# Patient Record
Sex: MALE | Race: BLACK OR AFRICAN AMERICAN
[De-identification: names, ages, dates, MRNs, and addresses within clinical notes are randomized per-mention and may not be internally consistent; named-entity substitution may affect disease eponyms.]

---
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
## 2019-07-24 NOTE — ER DOCUMENT REPORT
HPI





- HPI


Time Seen by Provider: 07/24/19 11:39


Pain Level: 5


Notes: 





33-year-old male presents the ED with complaints of right lower dental pain for 

the last 2 months, patient does have a dental appointment tomorrow morning, 

patient states the pain has become progressively worse, has not tried any 

over-the-counter medications, has not tried any heat or icing.  Denies any 

fevers or chills.  Has not been put on an antibiotic.  Patient does have dental 

cavities.  Pain is 7 out of 10, throbbing achy.  Denies any drug allergies.  

Denies fevers, chills,  chest pain,palpitations,  shortness of breath, dyspnea, 

nausea, vomiting, diarrhea, abdominal pain, hematuria,blurred vision, double 

vision, loss of vision, speech changes, LH, dizziness, syncope, headaches, 

wheezing, ST, URI, neck pain, weakness.





- CONSTITUTIONAL


Constitutional: DENIES: Fever, Chills





- EENT


EENT: DENIES: Sore Throat, Ear Pain, Eye problems





- NEURO


Neurology: DENIES: Headache, Weakness, Vision blurred, Dizzinesss / Vertigo





- CARDIOVASCULAR


Cardiovascular: DENIES: Chest pain





- RESPIRATORY


Respiratory: DENIES: Trouble Breathing, Coughing





- GASTROINTESTINAL


Gastrointestinal: DENIES: Abdominal Pain, Black / Bloody Stools





- URINARY


Urinary: DENIES: Dysuria, Urgency, Frequency





- MUSCULOSKELETAL


Musculoskeletal: DENIES: Extremity pain





Past Medical History





- General


Information source: Patient





- Social History


Smoking Status: Current Every Day Smoker


Chew tobacco use (# tins/day): No


Frequency of alcohol use: None


Drug Abuse: None


Family History: Reviewed & Not Pertinent, DM, Hypertension


Patient has suicidal ideation: No


Patient has homicidal ideation: No


Renal/ Medical History: Denies: Hx Peritoneal Dialysis


Musculoskeletal Medical History: Reports Hx Musculoskeletal Deformity, Reports 

Hx Musculoskeletal Trauma


Psychiatric Medical History: Reports: Hx Schizophrenia





- Immunizations


Hx Diphtheria, Pertussis, Tetanus Vaccination: Yes





Vertical Provider Document





- CONSTITUTIONAL


Agree With Documented VS: Yes


Exam Limitations: No Limitations


Notes: 





PHYSICAL EXAMINATION:





GENERAL: Well-appearing, well-nourished and in no acute distress.





HEAD: Atraumatic, normocephalic.





EYES: Pupils equal round and reactive to light, extraocular movements intact, 

sclera anicteric, conjunctiva are normal.





ENT: Nares patent, oropharynx clear without exudates.  Moist mucous membranes.  

#19,18 gingiva with swelling, erythema and  induration. No drainage or open 

wounds. No fluctuance. No facial swelling. Poor oral dentition, right lower jaw 

with extensive dental caries, no definite swelling or effusion.





NECK: Normal range of motion, supple without lymphadenopathy





LUNGS: Breath sounds clear to auscultation bilaterally and equal.  No wheezes 

rales or rhonchi.





HEART: Regular rate and rhythm without murmurs





ABDOMEN: Soft, nontender, nondistended abdomen.  No guarding, no rebound.  No 

masses appreciated.





Musculoskeletal: Normal range of motion, no pitting or edema.  No cyanosis.





NEUROLOGICAL: Cranial nerves grossly intact.  Normal speech, normal gait.  

Normal sensory, motor exams 





PSYCH: Normal mood, normal affect.





SKIN: Warm, Dry, normal turgor, no rashes or lesions noted.





- INFECTION CONTROL


TRAVEL OUTSIDE OF THE [place] IN LAST 30 DAYS: No





Course





- Re-evaluation


Re-evalutation: 





07/24/19 12:22


Vital signs stable, nursing notes reviewed, afebrile.  Discussed with patient 

that I need to start on an antibiotic for his dental caries, Toradol 60 mg given

IM for anti-inflammatory, alternate between Tylenol and ibuprofen. Presentation 

is most consistent with likely an infected tooth.  Airway is patent.  Vitals 

within normal limits.  Patient is able swallow without any difficulty.  There is

no significant facial swelling.  No evidence of He angina, apical abscess, o

r airway obstruction.  Patient will be started on antibiotics.  I've instructed 

to follow-up with dentistry as earliest ability for definitive management.  At 

this time will discharge with return precautions and follow-up recommendations. 

Verbal discharge instructions given a the bedside and opportunity for questions 

given. Medication warnings reviewed. Patient is in agreement with this plan and 

has verbalized understanding of return precautions and the need for primary care

follow-up in the next 24-72 hours.





- Vital Signs


Vital signs: 


                                        











Temp Pulse Resp BP Pulse Ox


 


 97.9 F   72   18   111/61   97 


 


 07/24/19 11:17  07/24/19 11:17  07/24/19 11:17  07/24/19 11:17  07/24/19 11:17














Discharge





- Discharge


Clinical Impression: 


 Dental caries





Condition: Stable


Disposition: HOME, SELF-CARE


Instructions:  Dentist, Dental Infection or Abscess ([facility])


Additional Instructions: 


Take antibiotics as directed, take with food to prevent any GI upset.  You are 

given Toradol today anti-inflammatory, apply heat 20 minutes on 20 minutes off 

several times a day.  Go to your dental appointment tomorrow as already 

scheduled.  Return if you experience any fevers, worsening pain, headache, neck 

pain, trismus, shortness of breath, confusion, chest pain etc.  





Return immediately for any new or worsening symptoms.





Follow up with primary care provider, call tomorrow to make followup appoint

ment.


Prescriptions: 


Clindamycin HCl 300 mg PO Q6H #28 capsule


Forms:  Return to Work


Referrals: 


SEVERO CASTRO MD [[facility]] - Follow up as needed

## 2019-12-15 ENCOUNTER — HOSPITAL ENCOUNTER (EMERGENCY)
Dept: HOSPITAL 62 - ER | Age: 34
Discharge: HOME | End: 2019-12-15
Payer: MEDICAID

## 2019-12-15 VITALS — SYSTOLIC BLOOD PRESSURE: 125 MMHG | DIASTOLIC BLOOD PRESSURE: 68 MMHG

## 2019-12-15 DIAGNOSIS — R19.7: ICD-10-CM

## 2019-12-15 DIAGNOSIS — R10.9: ICD-10-CM

## 2019-12-15 DIAGNOSIS — Z79.899: ICD-10-CM

## 2019-12-15 DIAGNOSIS — R11.2: Primary | ICD-10-CM

## 2019-12-15 LAB
ADD MANUAL DIFF: NO
ALBUMIN SERPL-MCNC: 4.7 G/DL (ref 3.5–5)
ALP SERPL-CCNC: 81 U/L (ref 38–126)
ANION GAP SERPL CALC-SCNC: 11 MMOL/L (ref 5–19)
APPEARANCE UR: CLEAR
APTT PPP: YELLOW S
AST SERPL-CCNC: 23 U/L (ref 17–59)
BASOPHILS # BLD AUTO: 0 10^3/UL (ref 0–0.2)
BASOPHILS NFR BLD AUTO: 0.4 % (ref 0–2)
BILIRUB DIRECT SERPL-MCNC: 0.1 MG/DL (ref 0–0.4)
BILIRUB SERPL-MCNC: 0.5 MG/DL (ref 0.2–1.3)
BILIRUB UR QL STRIP: NEGATIVE
BUN SERPL-MCNC: 11 MG/DL (ref 7–20)
CALCIUM: 10.3 MG/DL (ref 8.4–10.2)
CHLORIDE SERPL-SCNC: 99 MMOL/L (ref 98–107)
CO2 SERPL-SCNC: 28 MMOL/L (ref 22–30)
EOSINOPHIL # BLD AUTO: 0 10^3/UL (ref 0–0.6)
EOSINOPHIL NFR BLD AUTO: 0 % (ref 0–6)
ERYTHROCYTE [DISTWIDTH] IN BLOOD BY AUTOMATED COUNT: 13.2 % (ref 11.5–14)
GLUCOSE SERPL-MCNC: 112 MG/DL (ref 75–110)
GLUCOSE UR STRIP-MCNC: NEGATIVE MG/DL
HCT VFR BLD CALC: 45.3 % (ref 37.9–51)
HGB BLD-MCNC: 15.6 G/DL (ref 13.5–17)
KETONES UR STRIP-MCNC: (no result) MG/DL
LYMPHOCYTES # BLD AUTO: 1 10^3/UL (ref 0.5–4.7)
LYMPHOCYTES NFR BLD AUTO: 14 % (ref 13–45)
MCH RBC QN AUTO: 32.6 PG (ref 27–33.4)
MCHC RBC AUTO-ENTMCNC: 34.6 G/DL (ref 32–36)
MCV RBC AUTO: 94 FL (ref 80–97)
MONOCYTES # BLD AUTO: 0.2 10^3/UL (ref 0.1–1.4)
MONOCYTES NFR BLD AUTO: 3.2 % (ref 3–13)
NEUTROPHILS # BLD AUTO: 6 10^3/UL (ref 1.7–8.2)
NEUTS SEG NFR BLD AUTO: 82.4 % (ref 42–78)
NITRITE UR QL STRIP: NEGATIVE
PH UR STRIP: 7 [PH] (ref 5–9)
PLATELET # BLD: 189 10^3/UL (ref 150–450)
POTASSIUM SERPL-SCNC: 4 MMOL/L (ref 3.6–5)
PROT SERPL-MCNC: 8.2 G/DL (ref 6.3–8.2)
PROT UR STRIP-MCNC: 30 MG/DL
RBC # BLD AUTO: 4.8 10^6/UL (ref 4.35–5.55)
SP GR UR STRIP: 1.03
TOTAL CELLS COUNTED % (AUTO): 100 %
UROBILINOGEN UR-MCNC: NEGATIVE MG/DL (ref ?–2)
WBC # BLD AUTO: 7.3 10^3/UL (ref 4–10.5)

## 2019-12-15 PROCEDURE — 80053 COMPREHEN METABOLIC PANEL: CPT

## 2019-12-15 PROCEDURE — 36415 COLL VENOUS BLD VENIPUNCTURE: CPT

## 2019-12-15 PROCEDURE — 99284 EMERGENCY DEPT VISIT MOD MDM: CPT

## 2019-12-15 PROCEDURE — 85025 COMPLETE CBC W/AUTO DIFF WBC: CPT

## 2019-12-15 PROCEDURE — 81001 URINALYSIS AUTO W/SCOPE: CPT

## 2019-12-15 PROCEDURE — 96375 TX/PRO/DX INJ NEW DRUG ADDON: CPT

## 2019-12-15 PROCEDURE — 96374 THER/PROPH/DIAG INJ IV PUSH: CPT

## 2019-12-15 PROCEDURE — 96361 HYDRATE IV INFUSION ADD-ON: CPT

## 2019-12-15 NOTE — ER DOCUMENT REPORT
HPI





- HPI


Time Seen by Provider: 12/15/19 17:48


Pain Level: 2


Notes: 





34-year-old male patient presenting to the emergency department chief complaint 

of nausea, vomiting abdominal pain.  Patient reports symptoms started 2 days 

ago.  Patient reports he was around people with similar symptoms.  He denies any

diarrhea or fever.  He does report that he is supposed to be taking Lexapro and 

Haldol and stopped taking them 2 days ago.








- REPRODUCTIVE


Reproductive: DENIES: Pregnant:





Past Medical History





- General


Information source: Patient, Relative, Emergency Med Personnel





- Social History


Smoking Status: Never Smoker


Chew tobacco use (# tins/day): No


Frequency of alcohol use: None


Drug Abuse: None


Family History: Reviewed & Not Pertinent, DM, Hypertension


Patient has suicidal ideation: No


Patient has homicidal ideation: No


Renal/ Medical History: Denies: Hx Peritoneal Dialysis


Musculoskeletal Medical History: Reports Hx Musculoskeletal Deformity, Reports 

Hx Musculoskeletal Trauma


Psychiatric Medical History: Reports: Hx Schizophrenia


Surgical Hx: Negative





- Immunizations


Hx Diphtheria, Pertussis, Tetanus Vaccination: Yes





Vertical Provider Document





- CONSTITUTIONAL


Notes: 





PHYSICAL EXAMINATION:





GENERAL: Well-appearing, well-nourished and in no acute distress.





HEAD: Atraumatic, normocephalic.





EYES: Pupils equal round and reactive to light, extraocular movements intact, 

sclera anicteric, conjunctiva are normal.





ENT: Nares patent, oropharynx clear without exudates.  Moist mucous membranes.





NECK: Normal range of motion, supple without lymphadenopathy





LUNGS: Breath sounds clear to auscultation bilaterally and equal.  No wheezes 

rales or rhonchi.





HEART: Regular rate and rhythm without murmurs





ABDOMEN: Soft, nontender, nondistended abdomen.  No guarding, no rebound.  No 

masses appreciated.





Musculoskeletal: Normal range of motion, no pitting or edema.  No cyanosis.





NEUROLOGICAL: Cranial nerves grossly intact.  Normal speech, normal gait.  

Normal sensory, motor exams 





PSYCH: Normal mood, normal affect.





SKIN: Warm, Dry, normal turgor, no rashes or lesions noted.





- INFECTION CONTROL


TRAVEL OUTSIDE OF THE [place] IN LAST 30 DAYS: No





Course





- Re-evaluation


Re-evalutation: 





Laboratory











  12/15/19 12/15/19 12/15/19





  18:50 18:50 18:50


 


WBC  7.3  


 


RBC  4.80  


 


Hgb  15.6  


 


Hct  45.3  


 


MCV  94  


 


MCH  32.6  


 


MCHC  34.6  


 


RDW  13.2  


 


Plt Count  189  


 


Lymph % (Auto)  14.0  


 


Mono % (Auto)  3.2  


 


Eos % (Auto)  0.0  


 


Baso % (Auto)  0.4  


 


Absolute Neuts (auto)  6.0  


 


Absolute Lymphs (auto)  1.0  


 


Absolute Monos (auto)  0.2  


 


Absolute Eos (auto)  0.0  


 


Absolute Basos (auto)  0.0  


 


Seg Neutrophils %  82.4 H  


 


Sodium   137.9 


 


Potassium   4.0 


 


Chloride   99 


 


Carbon Dioxide   28 


 


Anion Gap   11 


 


BUN   11 


 


Creatinine   0.85 


 


Est GFR ( Amer)   > 60 


 


Est GFR (MDRD) Non-Af   > 60 


 


Glucose   112 H 


 


Calcium   10.3 H 


 


Total Bilirubin   0.5 


 


Direct Bilirubin   0.1 


 


Neonat Total Bilirubin   Not Reportable 


 


Neonat Direct Bilirubin   Not Reportable 


 


Neonat Indirect Bili   Not Reportable 


 


AST   23 


 


ALT   23 


 


Alkaline Phosphatase   81 


 


Total Protein   8.2 


 


Albumin   4.7 


 


Urine Color    YELLOW


 


Urine Appearance    CLEAR


 


Urine pH    7.0


 


Ur Specific Gravity    1.029


 


Urine Protein    30 H


 


Urine Glucose (UA)    NEGATIVE


 


Urine Ketones    TRACE H


 


Urine Blood    SMALL H


 


Urine Nitrite    NEGATIVE


 


Urine Bilirubin    NEGATIVE


 


Urine Urobilinogen    NEGATIVE


 


Ur Leukocyte Esterase    NEGATIVE


 


Urine WBC (Auto)    1


 


Urine RBC (Auto)    7


 


Urine Mucus (Auto)    MOD


 


Urine Ascorbic Acid    NEGATIVE








Patient appears well, nontoxic, vital signs within normal limits.  Labs are 

unremarkable.  At the time of my initial evaluation patient is resting with his 

eyes closed.  His wife is at the bedside stating that he has been sleeping for 

quite a while and she thinks he is feeling better.  Patient was awoken, his ab

domen is soft, nontender his physical exam is unremarkable.  No focal 

tenderness.  Likely gastroenteritis.  Patient will be discharged home in stable 

condition with prescription for antiemetics.  Patient verbalized understanding 

and agreement with ED return precautions.








- Vital Signs


Vital signs: 


                                        











Temp Pulse Resp BP Pulse Ox


 


 97.9 F   50 L  16   133/79 H  100 


 


 12/15/19 17:48  12/15/19 17:48  12/15/19 17:48  12/15/19 17:48  12/15/19 17:48














- Laboratory


Result Diagrams: 


                                 12/15/19 18:50





                                 12/15/19 18:50


Laboratory results interpreted by me: 


                                        











  12/15/19 12/15/19 12/15/19





  18:50 18:50 18:50


 


Seg Neutrophils %  82.4 H  


 


Glucose   112 H 


 


Calcium   10.3 H 


 


Urine Protein    30 H


 


Urine Ketones    TRACE H


 


Urine Blood    SMALL H














Discharge





- Discharge


Clinical Impression: 


 Nausea vomiting and diarrhea





Condition: Stable


Disposition: HOME, SELF-CARE


Additional Instructions: 


You are seen in the emergency department today with nausea, vomiting and 

diarrhea.  Your work-up was reassuring.  Please take Zofran as needed.  Follow-

up with your primary care provider and pride as discussed.  Return to the 

emergency department with any new or worsening symptoms to include bloody 

stools, bloody vomit, development of fever or persistent abdominal pain.





Prescriptions: 


Ondansetron [Zofran Odt 4 mg Tablet] 1 - 2 tab PO Q4H PRN #15 tab.rapdis


 PRN Reason: For Nausea/Vomiting

## 2019-12-15 NOTE — ER DOCUMENT REPORT
ED Medical Screen (RME)





- General


Chief Complaint: Abdominal Pain


Stated Complaint: ABDOMINAL PAIN


Time Seen by Provider: 12/15/19 17:48


Mode of Arrival: Medic


Information source: Patient, Relative, Emergency Med Personnel


Notes: 





34-year-old male presents emergency department with abdominal pain nausea 

vomiting reports he ran out of his Lexapro and Haldol 2 days ago.  Reports he 

takes it for bipolar schizophrenia.  Reports nausea vomiting abdominal pain 

since that time. 








I have greeted and performed a rapid initial assessment of this patient.  A 

comprehensive ED assessment and evaluation of the patient, analysis of test 

results and completion of the medical decision making process will be conducted 

by additional ED providers.


Dictation of this chart was performed using voice recognition software; 

therefore, there may be some unintended grammatical errors.


TRAVEL OUTSIDE OF THE [place] IN LAST 30 DAYS: No





- Related Data


Allergies/Adverse Reactions: 


                                        





No Known Allergies Allergy (Verified 12/15/19 17:48)


   











Past Medical History


Renal/ Medical History: Denies: Hx Peritoneal Dialysis


Musculoskeltal Medical History: Reports Hx Musculoskeletal Deformity, Reports Hx

Musculoskeletal Trauma


Psychiatric Medical History: Reports: Hx Schizophrenia





- Immunizations


Hx Diphtheria, Pertussis, Tetanus Vaccination: Yes

## 2020-01-07 ENCOUNTER — HOSPITAL ENCOUNTER (EMERGENCY)
Dept: HOSPITAL 62 - ER | Age: 35
Discharge: HOME | End: 2020-01-07
Payer: MEDICAID

## 2020-01-07 VITALS — SYSTOLIC BLOOD PRESSURE: 139 MMHG | DIASTOLIC BLOOD PRESSURE: 81 MMHG
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
## 2020-01-07 DIAGNOSIS — F17.200: ICD-10-CM

## 2020-01-07 DIAGNOSIS — Z23: ICD-10-CM

## 2020-01-07 DIAGNOSIS — M25.522: ICD-10-CM

## 2020-01-07 DIAGNOSIS — S51.002A: Primary | ICD-10-CM

## 2020-01-07 PROCEDURE — 90715 TDAP VACCINE 7 YRS/> IM: CPT

## 2020-01-07 PROCEDURE — 90471 IMMUNIZATION ADMIN: CPT

## 2020-01-07 PROCEDURE — 99283 EMERGENCY DEPT VISIT LOW MDM: CPT
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
